# Patient Record
Sex: FEMALE | Race: BLACK OR AFRICAN AMERICAN | NOT HISPANIC OR LATINO | URBAN - METROPOLITAN AREA
[De-identification: names, ages, dates, MRNs, and addresses within clinical notes are randomized per-mention and may not be internally consistent; named-entity substitution may affect disease eponyms.]

---

## 2017-10-05 ENCOUNTER — EMERGENCY (EMERGENCY)
Facility: HOSPITAL | Age: 52
LOS: 1 days | Discharge: HOME | End: 2017-10-05
Attending: EMERGENCY MEDICINE | Admitting: EMERGENCY MEDICINE
Payer: COMMERCIAL

## 2017-10-05 VITALS
RESPIRATION RATE: 16 BRPM | WEIGHT: 184.97 LBS | HEART RATE: 87 BPM | TEMPERATURE: 99 F | SYSTOLIC BLOOD PRESSURE: 149 MMHG | OXYGEN SATURATION: 99 % | DIASTOLIC BLOOD PRESSURE: 97 MMHG | HEIGHT: 63 IN

## 2017-10-05 PROCEDURE — 99283 EMERGENCY DEPT VISIT LOW MDM: CPT

## 2017-10-05 RX ORDER — ACYCLOVIR SODIUM 500 MG
1 VIAL (EA) INTRAVENOUS
Qty: 35 | Refills: 0
Start: 2017-10-05 | End: 2017-10-12

## 2017-10-05 RX ORDER — ACYCLOVIR SODIUM 500 MG
800 VIAL (EA) INTRAVENOUS ONCE
Qty: 0 | Refills: 0 | Status: COMPLETED | OUTPATIENT
Start: 2017-10-05 | End: 2017-10-05

## 2017-10-05 RX ORDER — GABAPENTIN 400 MG/1
3 CAPSULE ORAL
Qty: 90 | Refills: 0
Start: 2017-10-05 | End: 2017-10-15

## 2017-10-05 RX ORDER — ACYCLOVIR SODIUM 500 MG
800 VIAL (EA) INTRAVENOUS ONCE
Qty: 0 | Refills: 0 | Status: DISCONTINUED | OUTPATIENT
Start: 2017-10-05 | End: 2017-10-05

## 2017-10-05 RX ADMIN — Medication 800 MILLIGRAM(S): at 22:13

## 2017-10-05 NOTE — ED PROVIDER NOTE - PROGRESS NOTE DETAILS
Patient was evaluated by me, found in no acute distress, calm, speaking in complete sentences. Physical exam is remarkable for left lower leg rash consistent with varicella zoster. Prescription for acyclovir and neurontin will be sent to patient's pharmacy. Possibility of post-herpetic neuralgia was discussed with patient. Recommendations for rest and follow-up with PMD in 48-72 hours were given to patient. Will discharge home. I agree with PA assessment and plan. Dr. John Wilde

## 2017-10-05 NOTE — ED PROVIDER NOTE - CARE PLAN
Principal Discharge DX:	Shingles  Instructions for follow-up, activity and diet:	Follow up with your Primary Care Physician within the next 2-3 days  Bring a copy of your test results with you to your appointment  Continue your current medication regimen  Return to the Emergency Room if you experience new or worsening symptoms  Take acyclovir 5x per day for one week

## 2017-10-05 NOTE — ED PROVIDER NOTE - PLAN OF CARE
Follow up with your Primary Care Physician within the next 2-3 days  Bring a copy of your test results with you to your appointment  Continue your current medication regimen  Return to the Emergency Room if you experience new or worsening symptoms  Take acyclovir 5x per day for one week

## 2017-10-05 NOTE — ED ADULT NURSE NOTE - OBJECTIVE STATEMENT
50 y/o female presents to the ED ambulatory with steady gait. A&Ox3. C/O rash. Pt. states L sided arm and leg pain for two weeks. Pt. reports seeing botch of closed circular rash throughout her L leg today. Hx of chicken pox. No recent travel outside of [place]. +Easy work of breathing. Peripheral pulse present, cap refill<2 seconds. No edema. Skin is intact, dry, and warm to touch. Full range of motion of all extremities. Pt. denies headache, chills, fever, SOB, chest pain, N/V/D, or numbness and tingling.

## 2017-10-05 NOTE — ED PROVIDER NOTE - MEDICAL DECISION MAKING DETAILS
Dr. Wilde: Female patient with complaints of left leg rash that began today, being preceded by pain in affected area of left leg. Physical exam reveals vesicular rash in left leg consistent with varicella zoster. Prescriptions for acyclovir and neurontin will be sent to patient's pharmacy.

## 2019-01-22 ENCOUNTER — EMERGENCY (EMERGENCY)
Facility: HOSPITAL | Age: 54
LOS: 1 days | Discharge: HOME | End: 2019-01-22
Attending: EMERGENCY MEDICINE
Payer: COMMERCIAL

## 2019-01-22 VITALS
OXYGEN SATURATION: 96 % | RESPIRATION RATE: 19 BRPM | SYSTOLIC BLOOD PRESSURE: 146 MMHG | TEMPERATURE: 98 F | HEART RATE: 81 BPM | DIASTOLIC BLOOD PRESSURE: 96 MMHG

## 2019-01-22 VITALS — WEIGHT: 188.05 LBS | HEIGHT: 63 IN

## 2019-01-22 PROCEDURE — 96372 THER/PROPH/DIAG INJ SC/IM: CPT

## 2019-01-22 PROCEDURE — 99283 EMERGENCY DEPT VISIT LOW MDM: CPT | Mod: 25

## 2019-01-22 PROCEDURE — 99283 EMERGENCY DEPT VISIT LOW MDM: CPT

## 2019-01-22 RX ORDER — CYCLOBENZAPRINE HYDROCHLORIDE 10 MG/1
1 TABLET, FILM COATED ORAL
Qty: 5 | Refills: 0
Start: 2019-01-22 | End: 2019-01-26

## 2019-01-22 RX ORDER — LIDOCAINE 4 G/100G
1 CREAM TOPICAL ONCE
Qty: 0 | Refills: 0 | Status: COMPLETED | OUTPATIENT
Start: 2019-01-22 | End: 2019-01-22

## 2019-01-22 RX ORDER — CYCLOBENZAPRINE HYDROCHLORIDE 10 MG/1
10 TABLET, FILM COATED ORAL ONCE
Qty: 0 | Refills: 0 | Status: COMPLETED | OUTPATIENT
Start: 2019-01-22 | End: 2019-01-22

## 2019-01-22 RX ORDER — KETOROLAC TROMETHAMINE 30 MG/ML
30 SYRINGE (ML) INJECTION ONCE
Qty: 0 | Refills: 0 | Status: DISCONTINUED | OUTPATIENT
Start: 2019-01-22 | End: 2019-01-22

## 2019-01-22 RX ADMIN — LIDOCAINE 1 PATCH: 4 CREAM TOPICAL at 19:22

## 2019-01-22 RX ADMIN — Medication 30 MILLIGRAM(S): at 19:22

## 2019-01-22 RX ADMIN — Medication 30 MILLIGRAM(S): at 20:14

## 2019-01-22 RX ADMIN — CYCLOBENZAPRINE HYDROCHLORIDE 10 MILLIGRAM(S): 10 TABLET, FILM COATED ORAL at 19:29

## 2019-01-22 NOTE — ED PROVIDER NOTE - PHYSICAL EXAMINATION
Gen: Well appearing, NAD  Head: NCAT  HEENT: PERRL, MMM, normal conjunctiva, anicteric, neck supple  Lung: CTAB, no adventitious sounds  CV: RRR, no murmurs  Abd: soft, NTND, no rebound or guarding, no CVAT  MSK: No edema, no visible deformities, pain reproduced with retraction of scapula, pain located at L sided rhomboids  Neuro: CN II-XII grossly intact. 5/5 strength and normal sensation in all extremities. Ambulatory with stable gait.   Skin: Warm and dry, no evidence of rash  Psych: normal mood and affect

## 2019-01-22 NOTE — ED PROVIDER NOTE - PROGRESS NOTE DETAILS
Attending note (Adam): patient feeling some improvement after initial medications in ED, now reports less discomfort with movement of torso, turning/twisting movement.  Stable for dc with continued evaluation/management as outpatient.

## 2019-01-22 NOTE — ED PROVIDER NOTE - ATTENDING CONTRIBUTION TO CARE
53F with left upper back pain, started yesterday while folding laundry, worse with movement of her torso: bending/twisting movements or turning body/head to side (left>right).  No numbness, no weakness.  No fever/chills, no h/o IVDA or recent procedures.  Has had similar episodes of back pain as this in past, but this episode has been more persistent.  On exam, sitting up in stretcher, in NAD, afebrile, pulses equal (radial and dp) b/l, moving all extremities, no gross back/spinal deformities and no midline c/t/l tenderness.  Mild tenderness when pressing on lower left scapula, likely MSK/spasm in etiology; trial NSAIDs, consider muscle relaxant and reassess, if improving can be discharged.

## 2019-01-22 NOTE — ED PROVIDER NOTE - MEDICAL DECISION MAKING DETAILS
Given hx and physical exam, likely msk pain. No red flag back pain sx and no cp/sob or other reason to consider acs or lung pathologies.

## 2019-01-22 NOTE — ED ADULT NURSE NOTE - OBJECTIVE STATEMENT
53y coming in from home c/o back pain. A&Ox3 and VSS. Denies medical hx. Reports constant "internal" mid back pain radiating to the left middle back x3 days. Pt reports pain felt "internally" and cannot be palpated externally. Pain 7/10 unrelieved by 1G extra strength tylenol at 1400 today. Pain described as spasm and pulling sensation. Pt states pain started while folding sheets on Sunday night. Denies trauma to area. Denies numbness/tingling in extremities. Denies fever. Denies urinary symptoms. Denies bowel/bladder dysfunction. Ambulate with steady gait.

## 2019-01-22 NOTE — ED PROVIDER NOTE - NS ED ROS FT
AS PER HPI, otherwise:  Constitutional: no fever, no headache, no lightheadedness, no dizziness  Eyes: no conjunctivitis, no vision changes  Ears: no ear pain   Nose: no nasal congestion, Mouth/Throat: no throat pain, Neck: no stiffness  Cardiovascular: no chest pain, no palpitations  Chest: no cough, no shortness of breath  Gastrointestinal: no abdominal pain, no vomiting and diarrhea  MSK: see hpi  : no dysuria  Skin: no rash  Neuro: no LOC, no focal weakness, no sensory changes

## 2019-01-22 NOTE — ED ADULT NURSE NOTE - NSIMPLEMENTINTERV_GEN_ALL_ED
Implemented All Universal Safety Interventions:  [place] to call system. Call bell, personal items and telephone within reach. Instruct patient to call for assistance. Room bathroom lighting operational. Non-slip footwear when patient is off stretcher. Physically safe environment: no spills, clutter or unnecessary equipment. Stretcher in lowest position, wheels locked, appropriate side rails in place.

## 2019-01-22 NOTE — ED PROVIDER NOTE - NSFOLLOWUPINSTRUCTIONS_ED_ALL_ED_FT
My diagnosis was explained to me by Dr. Fernandes. Rest, drink plenty of fluids.  Advance activity as tolerated.  Continue all previously prescribed medications as directed.  Follow up with your primary care physician in 24-48 hours- bring copies of your results if you were given. If you do not have a primary care physician, call our clinic at [phone], or call [phone].  Return to the Emergency Department for worsening or persistent symptoms OR ANY NEW OR CONCERNING SYMPTOMS including but not limited to worsening pain, numbness/tingling/weakness in any extremity, fever, chest pain, shortness of breath, or any other concern to you. Some of your prescribed medication may make you drowsy, do not drive after taking. Please follow up with the spine center as discussed.

## 2021-05-05 ENCOUNTER — APPOINTMENT (OUTPATIENT)
Dept: DISASTER EMERGENCY | Facility: OTHER | Age: 56
End: 2021-05-05
Payer: COMMERCIAL

## 2021-05-05 PROCEDURE — 0001A: CPT

## 2021-05-26 ENCOUNTER — APPOINTMENT (OUTPATIENT)
Dept: DISASTER EMERGENCY | Facility: OTHER | Age: 56
End: 2021-05-26
Payer: COMMERCIAL

## 2021-05-26 PROCEDURE — 0002A: CPT

## 2021-12-22 ENCOUNTER — EMERGENCY (EMERGENCY)
Facility: HOSPITAL | Age: 56
LOS: 0 days | Discharge: HOME | End: 2021-12-22
Attending: EMERGENCY MEDICINE
Payer: COMMERCIAL

## 2021-12-22 VITALS
HEART RATE: 74 BPM | TEMPERATURE: 98 F | OXYGEN SATURATION: 97 % | DIASTOLIC BLOOD PRESSURE: 85 MMHG | RESPIRATION RATE: 20 BRPM | SYSTOLIC BLOOD PRESSURE: 147 MMHG

## 2021-12-22 VITALS
TEMPERATURE: 98 F | HEART RATE: 79 BPM | WEIGHT: 214.95 LBS | SYSTOLIC BLOOD PRESSURE: 157 MMHG | OXYGEN SATURATION: 100 % | RESPIRATION RATE: 20 BRPM | HEIGHT: 63 IN | DIASTOLIC BLOOD PRESSURE: 84 MMHG

## 2021-12-22 DIAGNOSIS — M25.571 PAIN IN RIGHT ANKLE AND JOINTS OF RIGHT FOOT: ICD-10-CM
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
## 2021-12-22 DIAGNOSIS — S82.831A OTHER FRACTURE OF UPPER AND LOWER END OF RIGHT FIBULA, INITIAL ENCOUNTER FOR CLOSED FRACTURE: ICD-10-CM
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
## 2021-12-22 PROCEDURE — 73590 X-RAY EXAM OF LOWER LEG: CPT | Mod: 26,77

## 2021-12-22 PROCEDURE — 73600 X-RAY EXAM OF ANKLE: CPT | Mod: 26,59,RT

## 2021-12-22 PROCEDURE — 73610 X-RAY EXAM OF ANKLE: CPT | Mod: 26,RT

## 2021-12-22 PROCEDURE — 73590 X-RAY EXAM OF LOWER LEG: CPT | Mod: 26,RT

## 2021-12-22 PROCEDURE — 99284 EMERGENCY DEPT VISIT MOD MDM: CPT

## 2021-12-22 RX ORDER — LEVOTHYROXINE SODIUM 125 MCG
0 TABLET ORAL
Qty: 0 | Refills: 0 | DISCHARGE

## 2021-12-22 RX ORDER — VALSARTAN 80 MG/1
0 TABLET ORAL
Qty: 0 | Refills: 0 | DISCHARGE

## 2021-12-22 RX ORDER — IBUPROFEN 200 MG
600 TABLET ORAL ONCE
Refills: 0 | Status: COMPLETED | OUTPATIENT
Start: 2021-12-22 | End: 2021-12-22

## 2021-12-22 RX ADMIN — Medication 600 MILLIGRAM(S): at 07:57

## 2021-12-22 NOTE — ED PROVIDER NOTE - PATIENT PORTAL LINK FT
You can access the FollowMyHealth Patient Portal offered by [facility] by registering at the following website: [email or web address]. By joining [facility]’s FollowMyHealth portal, you will also be able to view your health information using other applications (apps) compatible with our system.

## 2021-12-22 NOTE — ED PROVIDER NOTE - NSFOLLOWUPINSTRUCTIONS_ED_ALL_ED_FT
Follow up with your primary care doctor within the next 24-48 hours and bring copy of your results.  Return to the Emergency Department for worsening or persistent symptoms or any other concerns incl. chest pain, shortness of breath, dizziness, inability to tolerate oral intake.  Rest, drink plenty of fluids.  Advance activity as tolerated.  Continue all previously prescribed medications as directed. You can use motrin 600mg every 6-8 hours for pain or fever, and/or Tylenol 650 mg every 4 hours for pain/fever (Max 4g/day of tylenol)    You were prescribed oxycodone for severe pain.

## 2021-12-22 NOTE — ED PROVIDER NOTE - CARE PROVIDER_API CALL
Mayco Bond)  Orthopaedic Surgery  [address], [address]  [place] [address]  Phone: [phone]  Fax: [phone]  Follow Up Time:

## 2021-12-22 NOTE — CONSULT NOTE ADULT - SUBJECTIVE AND OBJECTIVE BOX
Incomplete note    56yFemale c/o ankle pain s/p mechanical fall. Slipped and fell from standing height. Denies headstrike/LOC. Denies any other trauma or injuries. Denies numbness/tingling. Community ambulator at baseline with no assistive devices.    PAST MEDICAL & SURGICAL HISTORY:  Hypothyroidism    HTN (hypertension)      Home Medications:  levothyroxine:  (22 Dec 2021 07:45)  valsartan:  (22 Dec 2021 07:45)    Allergies    No Known Allergies    Intolerances        Vital Signs Last 24 Hrs  T(C): 36.6 (22 Dec 2021 07:29), Max: 36.6 (22 Dec 2021 07:29)  T(F): 97.9 (22 Dec 2021 07:29), Max: 97.9 (22 Dec 2021 07:29)  HR: 79 (22 Dec 2021 07:29) (79 - 79)  BP: 157/84 (22 Dec 2021 07:29) (157/84 - 157/84)  BP(mean): --  RR: 20 (22 Dec 2021 07:29) (20 - 20)  SpO2: 100% (22 Dec 2021 07:29) (100% - 100%)  Physical Exam  Gen: NAD, resting comfortably    RLE  Skin intact, mild to moderate swelling over lateral mal  TTP over ankle  ROM limited due to pain  +TA/EHL/FHL/GS  SILT L2-S1  DP/PT 2+  Compartments soft and compressible    RUE: No bony tenderness or deformity, skin intact  LUE: No bony tenderness or deformity, skin intact  LE: No bony tenderness or deformity, skin intact  Spine: No tenderness or stepoffs, skin intact                    Imaging:  XR Ankle: Showing Abreu B ankle fracture  Stress View Ankle: No medial clear space widening    Procedure: Patient advised of need for closed reduction of fracture, patient verbalized understanding and consented to the procedure. Patient neurovascularly intact pre-procedure. Closed reduction performed followed by placement of a well padded trilam splint. Patient tolerated the procedure well. Post procedure imaging obtained and showed improved alignment. Post procedure the patient was NV intact.    A/P: 56yFemale with ankle fracture s/p closed reduction and splinting    - Pain control  - NWB LE in splint  - Keep splint clean, dry and intact until follow up  - Rest ice elevate  - Ice/elevation  - Follow up with  in office, please call office for appointment  - Will discuss plan with attending and advise of any changes         56yFemale c/o ankle pain s/p mechanical fall. Slipped and fell from standing height while taking the dog out. Denies headstrike/LOC. Denies any other trauma or injuries. Denies numbness/tingling. Community ambulator at baseline with no assistive devices.    PAST MEDICAL & SURGICAL HISTORY:  Hypothyroidism    HTN (hypertension)      Home Medications:  levothyroxine:  (22 Dec 2021 07:45)  valsartan:  (22 Dec 2021 07:45)    Allergies    No Known Allergies    Intolerances        Vital Signs Last 24 Hrs  T(C): 36.6 (22 Dec 2021 07:29), Max: 36.6 (22 Dec 2021 07:29)  T(F): 97.9 (22 Dec 2021 07:29), Max: 97.9 (22 Dec 2021 07:29)  HR: 79 (22 Dec 2021 07:29) (79 - 79)  BP: 157/84 (22 Dec 2021 07:29) (157/84 - 157/84)  BP(mean): --  RR: 20 (22 Dec 2021 07:29) (20 - 20)  SpO2: 100% (22 Dec 2021 07:29) (100% - 100%)  Physical Exam  Gen: NAD, resting comfortably    RLE  Skin intact, mild to moderate swelling over lateral mal  TTP over ankle  ROM limited due to pain  +TA/EHL/FHL/GS  SILT L2-S1  DP/PT 2+  Compartments soft and compressible    Secondary Survey:   No TTP over bony prominences, SILT, palpable pulses, full/painless A/PROM, compartments soft. No TTP over spinous processes or paraspinal muscles at C/T/L spine. No palpable step off. No other injuries or complaints.      Imaging:  XR Ankle: Showing Abreu B ankle fracture  Stress View Ankle: Positive medial clear space widening    Procedure: Patient advised of need for closed reduction of fracture, patient verbalized understanding and consented to the procedure. Patient neurovascularly intact pre-procedure. Closed reduction performed followed by placement of a well padded trilam splint. Patient tolerated the procedure well. Post procedure imaging obtained and showed improved alignment. Post procedure the patient was NV intact.    A/P: 56yFemale with Right bimalleolar equivalent ankle fracture s/p closed reduction and splinting    - Pain control  - NWB RLE in splint, crutches to assist in ambulation  - Keep splint clean, dry and intact until follow up  - Rest ice elevate  - Follow up with Dr. Mayco Bond in office in 3-5 days, please call office for appointment  - Discussed with patient the likely need for operative fixation for this type of injury pattern  - Discussed plan with attending and advise of any changes

## 2021-12-22 NOTE — ED ADULT NURSE NOTE - NSFALLRSKPASTHIST_ED_ALL_ED
Alert and oriented to person, place and time, memory intact, behavior appropriate to situation, PERRL. yes

## 2021-12-22 NOTE — ED PROVIDER NOTE - PHYSICAL EXAMINATION
Gen: Alert, Well appearing. NAD    Head: NC, AT, PERRL, normal lids/conjunctiva   Mskel: + rt lateral mal and prox lateral mal tenderness. no gross deformity. sensation and pulses intact. FROM. no significant edema.   Skin: no rash, no bruising  Neuro: AAOx3, no sensory/motor deficits

## 2021-12-22 NOTE — ED ADULT NURSE NOTE - OBJECTIVE STATEMENT
A&Ox4, c/o R ankle pain radiating to R foot. pt states she slipped outside while waling dog, and landed at the bottom of the stairs, pt denies head trauma, LOC. pain 6/10 with rest, 10/10 with movement. mild swelling noted at R ankle. denies chest pain, sob, dizziness, nausea, vomiting,

## 2021-12-22 NOTE — ED PROVIDER NOTE - OBJECTIVE STATEMENT
55yo female with pmh HTN presents with rt ankle pain s/p slipping in rain while taking dog out, ~ 3steps. denies head injury, LOC, dizziness, abd pain, NVD, dysuria,    No fever/chills, No photophobia/eye pain/changes in vision, No ear pain/sore throat, No chest pain/palpitations, no SOB/cough, No abdominal pain, No N/V/D, no dysuria/frequency/discharge, No neck/back pain, no rash, no changes in neurological status/function. + ankle pain

## 2021-12-23 ENCOUNTER — APPOINTMENT (OUTPATIENT)
Dept: ORTHOPEDIC SURGERY | Facility: CLINIC | Age: 56
End: 2021-12-23

## 2021-12-23 RX ORDER — OXYCODONE AND ACETAMINOPHEN 5; 325 MG/1; MG/1
1 TABLET ORAL
Qty: 12 | Refills: 0
Start: 2021-12-23 | End: 2021-12-25

## 2022-01-03 PROBLEM — E03.9 HYPOTHYROIDISM, UNSPECIFIED: Chronic | Status: ACTIVE | Noted: 2021-12-22

## 2022-01-03 PROBLEM — I10 ESSENTIAL (PRIMARY) HYPERTENSION: Chronic | Status: ACTIVE | Noted: 2021-12-22

## 2022-01-06 ENCOUNTER — APPOINTMENT (OUTPATIENT)
Dept: ORTHOPEDIC SURGERY | Facility: CLINIC | Age: 57
End: 2022-01-06
Payer: COMMERCIAL

## 2022-01-06 VITALS — BODY MASS INDEX: 38.09 KG/M2 | WEIGHT: 215 LBS | HEIGHT: 63 IN

## 2022-01-06 DIAGNOSIS — S82.831A OTHER FRACTURE OF UPPER AND LOWER END OF RIGHT FIBULA, INITIAL ENCOUNTER FOR CLOSED FRACTURE: ICD-10-CM

## 2022-01-06 PROCEDURE — 73610 X-RAY EXAM OF ANKLE: CPT | Mod: RT

## 2022-01-06 PROCEDURE — 29405 APPL SHORT LEG CAST: CPT | Mod: RT

## 2022-01-06 PROCEDURE — 99203 OFFICE O/P NEW LOW 30 MIN: CPT | Mod: 25

## 2022-01-06 NOTE — END OF VISIT
[FreeTextEntry3] : All medical record entries made by the Scribe were at my,  Dr. Edmund Bradley MD., direction and personally dictated by me on 01/06/2022. I have personally reviewed the chart and agree that the record accurately reflects my personal performance of the history, physical exam, assessment and plan.

## 2022-01-06 NOTE — PHYSICAL EXAM
[de-identified] : Patient is WDWN, alert, and in no acute distress. Breathing is unlabored. She is grossly oriented to person, place, and time.\par \par Right Ankle:\par Patient presents in a posterior splint which was removed for physical exam.\par No focal tenderness to palpation medially\par Tenderness to palpation present to the ATFL\par Mild edema noted status post injury\par Limited dorsiflexion of the ankle, with full plantarflexion\par Full arc of motion to the toes with normal sensation. [de-identified] : AP, lateral and oblique views of the RIGHT ankle were obtained today and revealed nondisplaced fracture of the distal fibular shaft.\par \par ------------------------------------------------------------------------------------------------------------------------------------------------------------------------------------\par \par XR ANKLE POST RED 2 VIEWS RT\par PROCEDURE DATE: 12/22/2021\par IMPRESSION:\par Four views of the right leg demonstrate anatomical alignment post reduction.\par \par ASIA PEPPER DO; Attending Radiologist\par This document has been electronically signed. Dec 22 2021 12:30PM\par \par ------------------------------------------------------------------------------------------------------------------------------------------------------------------------------------\par \par EXAM: XR TIB FIB AP LAT 2 VIEWS RT\par EXAM: XR ANKLE 2 VIEWS RT\par EXAM: XR ANKLE COMP MIN 3 VIEWS RT\par \par PROCEDURE DATE: 12/22/2021\par IMPRESSION:\par Left tib-fib. 2 views. 4 images. Patient had local trauma.\par \par The knee is unremarkable. Upper bones intact.\par \par Right ankle. 3 views.\par \par There is a nondisplaced fracture of the distal fibular shaft.\par \par 2 images with stress. There is no disalignment with stress.\par \par NEMESIO WISEMAN MD; Attending Radiologist\par This document has been electronically signed. Dec 22 2021 11:29AM

## 2022-01-06 NOTE — DISCUSSION/SUMMARY
[de-identified] : The underlying pathophysiology was reviewed with the patient. XR films were reviewed with the patient. Discussed at length the nature of the patient’s condition. The right ankle symptoms appear secondary to fracture of the distal fibula shaft.\par \par At this time, after review of xrays and a thorough physical exam, I am not recommending surgical management as she does not have ligament tenderness indicative of disruption of the mortise. I am therefore recommending cast immobilization for the next 4 weeks. She will remain NWB during this duration of time.\par A right short leg cast was placed on 1/6/22.\par Proper cast care instructions were reviewed with the patient.\par She was instructed on leg lift exercises while casted.\par Motrin prn.\par \par All questions answered, understanding verbalized. Patient in agreement with plan of care. Follow up in 4 weeks for cast removal and repeat xrays.

## 2022-01-06 NOTE — ADDENDUM
[FreeTextEntry1] : I, Davina Echols wrote this note acting as a scribe for Dr. Edmund Bradley on Jan 06, 2022.

## 2022-01-06 NOTE — HISTORY OF PRESENT ILLNESS
[de-identified] : Pt is a 55 y/o female with right ankle fracture.  She tripped and fell outside her home on 12/22/21.  She had pain immediately.  She went to [place] ED where xrays revealed a right distal fibula fracture.  The stress view revealed widening of the mortise.  The fracture was reduced and a splint was applied.  She was told that she would need surgery.  She presents today to discuss her options.

## 2022-01-20 ENCOUNTER — APPOINTMENT (OUTPATIENT)
Dept: ORTHOPEDIC SURGERY | Facility: CLINIC | Age: 57
End: 2022-01-20
Payer: COMMERCIAL

## 2022-01-20 VITALS — HEIGHT: 63 IN | BODY MASS INDEX: 38.09 KG/M2 | WEIGHT: 215 LBS

## 2022-01-20 DIAGNOSIS — S82.839E: ICD-10-CM

## 2022-01-20 PROCEDURE — 73610 X-RAY EXAM OF ANKLE: CPT | Mod: RT

## 2022-01-20 PROCEDURE — 99213 OFFICE O/P EST LOW 20 MIN: CPT | Mod: 25

## 2022-01-20 PROCEDURE — 29405 APPL SHORT LEG CAST: CPT | Mod: RT

## 2022-01-20 NOTE — HISTORY OF PRESENT ILLNESS
[de-identified] : Pt is a 57 y/o female with right ankle fracture.  She tripped and fell outside her home on 12/22/21.  She had pain immediately.  She went to [place] ED where xrays revealed a right distal fibula fracture.  The stress view revealed widening of the mortise.  The fracture was reduced and a splint was applied.  She was told that she would need surgery.  She presented to the office on 1/6/22 at which time I did not recommend surgery and she was placed into a short leg cast. She returns on 1/20/22 as the cast got wet and is falling apart. She returns for cast change and repeat xrays.

## 2022-01-20 NOTE — END OF VISIT
[FreeTextEntry3] : All medical record entries made by the Scribe were at my,  Dr. Edmund Bradley MD., direction and personally dictated by me on 01/20/2022. I have personally reviewed the chart and agree that the record accurately reflects my personal performance of the history, physical exam, assessment and plan.

## 2022-01-20 NOTE — ADDENDUM
[FreeTextEntry1] : I, Davina Echols wrote this note acting as a scribe for Dr. Edmund Bradley on Jan 20, 2022.

## 2022-01-20 NOTE — DISCUSSION/SUMMARY
[de-identified] : The underlying pathophysiology was reviewed with the patient. XR films were reviewed with the patient. Discussed at length the nature of the patient’s condition. The right ankle symptoms appear secondary to fracture of the distal fibula shaft.\par \par At this time, after review of xrays and a thorough physical exam, I am not recommending surgical management as she does not have ligament tenderness indicative of disruption of the mortise. She will remain in the cast for another 2 weeks. She will WBAT. \par Right short leg cast was removed and replaced on 1/20/22. \par Proper cast care instructions once again reviewed.\par She was advised to continue with 81mg baby aspirin BID.\par Instructed on leg lift strengthening exercises of the quadriceps.\par \par All questions answered, understanding verbalized. Patient in agreement with plan of care. Follow up in 2 weeks for cast removal and repeat xrays.

## 2022-01-20 NOTE — PHYSICAL EXAM
[de-identified] : Patient is WDWN, alert, and in no acute distress. Breathing is unlabored. She is grossly oriented to person, place, and time.\par \par She is accompanied by her  today. \par \par Right Ankle:\par Patient presents in a short leg cast which was removed in office today as she got the cast wet.\par No focal tenderness to palpation medially while the cast was removed.\par Tenderness present to the ATFL\par Mild edema noted\par ROM to the ankle not accessed while the cast was removed.\par Full arc of motion to the toes with normal sensation\par Tightening present of the gastrocnemius secondary to immobilization. [de-identified] : AP, lateral and oblique views of the RIGHT ankle were obtained today and revealed nondisplaced fracture of the distal fibular shaft in good position. No widening of the mortise. Interval healing taking place

## 2022-02-03 ENCOUNTER — APPOINTMENT (OUTPATIENT)
Dept: ORTHOPEDIC SURGERY | Facility: CLINIC | Age: 57
End: 2022-02-03
Payer: COMMERCIAL

## 2022-02-03 DIAGNOSIS — S82.831D OTHER FRACTURE OF UPPER AND LOWER END OF RIGHT FIBULA, SUBSEQUENT ENCOUNTER FOR CLOSED FRACTURE WITH ROUTINE HEALING: ICD-10-CM

## 2022-02-03 PROCEDURE — 73610 X-RAY EXAM OF ANKLE: CPT | Mod: RT

## 2022-02-03 PROCEDURE — 99213 OFFICE O/P EST LOW 20 MIN: CPT

## 2022-02-03 NOTE — END OF VISIT
[FreeTextEntry3] : All medical record entries made by the Scribe were at my,  Dr. Edmund Bradley MD., direction and personally dictated by me on 02/03/2022. I have personally reviewed the chart and agree that the record accurately reflects my personal performance of the history, physical exam, assessment and plan.

## 2022-02-03 NOTE — PHYSICAL EXAM
[de-identified] : Patient is WDWN, alert, and in no acute distress. Breathing is unlabored. She is grossly oriented to person, place, and time.\par \par She is accompanied by her  today. \par \par Right Ankle:\par Patient presents in a short leg cast which was removed in office today on 2/3/22\par No focal tenderness to palpation medially\par Tenderness present to the ATFL\par Mild edema noted\par ROM to the ankle is limited status post casting 5 weeks. \par Full arc of motion to the toes with normal sensation\par Tightening and atrophy present of the gastrocnemius secondary to immobilization. [de-identified] : AP, lateral and oblique views of the RIGHT ankle were obtained today and revealed nondisplaced fracture of the distal fibular  in good position. No widening of the mortise. Interval healing taking place

## 2022-02-03 NOTE — DISCUSSION/SUMMARY
[de-identified] : The underlying pathophysiology was reviewed with the patient. XR films were reviewed with the patient. Discussed at length the nature of the patient’s condition. The right ankle symptoms appear secondary to fracture of the distal fibula shaft.\par \par The short leg cast was removed in office today on 2/3/22.\par She was advised that she may transition into a comfortable sneaker and WBAT.\par She deferred an RX for PT and would like to follow an at home exercise program.\par She may soak the foot and ankle in warm water and Epsom salts to alleviate stiffness.\par \par All questions answered, understanding verbalized. Patient in agreement with plan of care. Follow up in 4 weeks for repeat xrays.

## 2022-02-03 NOTE — ADDENDUM
[FreeTextEntry1] : I, Davina Echols wrote this note acting as a scribe for Dr. Edmund Bradley on Feb 03, 2022.

## 2022-02-03 NOTE — HISTORY OF PRESENT ILLNESS
[de-identified] : Pt is a 57 y/o female with right ankle fracture.  She tripped and fell outside her home on 12/22/21.  She had pain immediately.  She went to [place] ED where xrays revealed a right distal fibula fracture.  The stress view revealed widening of the mortise.  The fracture was reduced and a splint was applied.  She was told that she would need surgery.  She presented to the office on 1/6/22 at which time I did not recommend surgery and she was placed into a short leg cast. She returned on 1/20/22 as the cast got wet and is falling apart. She returns for cast change and repeat xrays. She presents once again for repeat xrays on 2/3/22. She presents as well for cast removal. Since the cast was removed she denies stiffness but does feel as if her calf has atrophied.

## 2022-03-10 ENCOUNTER — APPOINTMENT (OUTPATIENT)
Dept: ORTHOPEDIC SURGERY | Facility: CLINIC | Age: 57
End: 2022-03-10

## 2023-01-24 NOTE — ED ADULT NURSE NOTE - NSFALLRSKASSESSTYPE_ED_ALL_ED
Left a message for mother returning her call.   Attempting to schedule an appointment. Will require clinical indication for visit and records.    Initial (On Arrival)

## 2023-07-07 NOTE — ED ADULT NURSE NOTE - NS ED NURSE LEVEL OF CONSCIOUSNESS ORIENTATION
Case Management Discharge Planning    Admission Date: 7/4/2023  GMLOS: 8.5  ALOS: 3    6-Clicks ADL Score: 21  6-Clicks Mobility Score: 13  PT and/or OT Eval ordered: Yes  Post-acute Referrals Ordered: No  Post-acute Choice Obtained: No  Has referral(s) been sent to post-acute provider:  No      Anticipated Discharge Dispo: Discharge Disposition: Disch to  rehab facility or distinct part unit (62)        Action(s) Taken:   Fax request to [facility] insurance RN CM for assistance with case management.  Patient pending [employer].    Escalations Completed: Insurance     Medically Clear: No    Next Steps: Follow up with [facility] insurance RN CM.    Barriers to Discharge: Medical clearance and Pending Placement             Oriented - self; Oriented - place; Oriented - time

## 2025-04-21 ENCOUNTER — APPOINTMENT (OUTPATIENT)
Dept: ORTHOPEDIC SURGERY | Facility: CLINIC | Age: 60
End: 2025-04-21
Payer: COMMERCIAL

## 2025-04-21 VITALS — WEIGHT: 206 LBS | BODY MASS INDEX: 36.5 KG/M2 | HEIGHT: 63 IN

## 2025-04-21 DIAGNOSIS — S82.891S OTHER FRACTURE OF RIGHT LOWER LEG, SEQUELA: ICD-10-CM

## 2025-04-21 DIAGNOSIS — S82.831A OTHER FRACTURE OF UPPER AND LOWER END OF RIGHT FIBULA, INITIAL ENCOUNTER FOR CLOSED FRACTURE: ICD-10-CM

## 2025-04-21 PROCEDURE — 99203 OFFICE O/P NEW LOW 30 MIN: CPT

## 2025-04-21 PROCEDURE — 73610 X-RAY EXAM OF ANKLE: CPT | Mod: RT
